# Patient Record
(demographics unavailable — no encounter records)

---

## 2025-02-04 NOTE — HEALTH RISK ASSESSMENT
[Excellent] : ~his/her~ current health as excellent [Good] : ~his/her~  mood as  good [Yes] : Yes [Monthly or less (1 pt)] : Monthly or less (1 point) [1 or 2 (0 pts)] : 1 or 2 (0 points) [No falls in past year] : Patient reported no falls in the past year [With Family] : lives with family [# of Members in Household ___] :  household currently consist of [unfilled] member(s) [Employed] : employed [College] : College [Significant Other] : lives with significant other [# Of Children ___] : has [unfilled] children [Never] : Never [Never (0 pts)] : Never (0 points) [No] : In the past 12 months have you used drugs other than those required for medical reasons? No [Patient reported mammogram was normal] : Patient reported mammogram was normal [Patient reported bone density results were normal] : Patient reported bone density results were normal [Patient reported colonoscopy was normal] : Patient reported colonoscopy was normal [HIV test declined] : HIV test declined [Hepatitis C test declined] : Hepatitis C test declined [None] : None [Fully functional (bathing, dressing, toileting, transferring, walking, feeding)] : Fully functional (bathing, dressing, toileting, transferring, walking, feeding) [Fully functional (using the telephone, shopping, preparing meals, housekeeping, doing laundry, using] : Fully functional and needs no help or supervision to perform IADLs (using the telephone, shopping, preparing meals, housekeeping, doing laundry, using transportation, managing medications and managing finances) [Reports normal functional visual acuity (ie: able to read med bottle)] : Reports normal functional visual acuity [1] : 2) Feeling down, depressed, or hopeless for several days (1) [PHQ-2 Negative - No further assessment needed] : PHQ-2 Negative - No further assessment needed [Patient reported PAP Smear was normal] : Patient reported PAP Smear was normal [Audit-CScore] : 1 [de-identified] : Strength training, high intensity training, hiking, paddling  [de-identified] : pretty good, gluten free diet  [[id number]] : 2 [Change in mental status noted] : No change in mental status noted [Reports changes in hearing] : Reports no changes in hearing [Reports changes in vision] : Reports no changes in vision [Reports changes in dental health] : Reports no changes in dental health [MammogramDate] : 09/24 [PapSmearDate] : 11/23 [BoneDensityDate] : 11/24 [ColonoscopyDate] : 05/23 [ColonoscopyComments] : f/u 5-7yrs at [facility] [FreeTextEntry2] : yearn [FreeTextEntry3] : 14 and 18 yo

## 2025-02-04 NOTE — ASSESSMENT
[FreeTextEntry1] : 48 year old female presented for an annual physical exam and to establish care. Reviewed history with patient.   routine blood work today, blood collected in the office. up to date with screening. will call back with results.

## 2025-02-04 NOTE — HISTORY OF PRESENT ILLNESS
[FreeTextEntry1] : Annual and establish care  [de-identified] : 48-year-old female presenting for an annual physical and to establish care.  PMH: Osteopenia (2024; on vitD), follicular thyroid adenoma s/p partial thyroidectomy (not on meds), b/l breast radial scars s/p biopsy. Interstitial cystitis (last flare up for over years ago), ADHD, and anxiety, panic disorder and ocd PSH: D&C, tonsillectomy, breast biopsy, widsom tooth removal.  FMH: Mother-ovarian cancer at 48yo, HTN, HLD, heart disease. Patient recently found out about biological father (sperm donation) who  by suicide at age 39, Paternal aunt - pancreatic cancer.   Breast surgeon: Dr. Reeder  Gyn: Dr. Brenda Marquez  Endocrinologist: Dr. Lewis  Psychiatrist: Apoorva Canales NP with [facility]   Cardiologist: Dr. Edwards